# Patient Record
Sex: FEMALE | Race: WHITE | NOT HISPANIC OR LATINO | Employment: FULL TIME | URBAN - METROPOLITAN AREA
[De-identification: names, ages, dates, MRNs, and addresses within clinical notes are randomized per-mention and may not be internally consistent; named-entity substitution may affect disease eponyms.]

---

## 2023-02-16 ENCOUNTER — HOSPITAL ENCOUNTER (EMERGENCY)
Facility: CLINIC | Age: 43
Discharge: HOME | End: 2023-02-16
Attending: EMERGENCY MEDICINE | Admitting: EMERGENCY MEDICINE
Payer: COMMERCIAL

## 2023-02-16 VITALS
TEMPERATURE: 97.7 F | OXYGEN SATURATION: 100 % | SYSTOLIC BLOOD PRESSURE: 127 MMHG | HEART RATE: 65 BPM | HEIGHT: 70 IN | RESPIRATION RATE: 16 BRPM | WEIGHT: 141.3 LBS | BODY MASS INDEX: 20.23 KG/M2 | DIASTOLIC BLOOD PRESSURE: 64 MMHG

## 2023-02-16 DIAGNOSIS — F32.A DEPRESSION, UNSPECIFIED DEPRESSION TYPE: ICD-10-CM

## 2023-02-16 DIAGNOSIS — F41.0 ANXIETY ATTACK: ICD-10-CM

## 2023-02-16 LAB — SARS-COV-2 RNA RESP QL NAA+PROBE: NEGATIVE

## 2023-02-16 PROCEDURE — 250N000013 HC RX MED GY IP 250 OP 250 PS 637: Performed by: NURSE PRACTITIONER

## 2023-02-16 PROCEDURE — C9803 HOPD COVID-19 SPEC COLLECT: HCPCS

## 2023-02-16 PROCEDURE — 90791 PSYCH DIAGNOSTIC EVALUATION: CPT

## 2023-02-16 PROCEDURE — 99283 EMERGENCY DEPT VISIT LOW MDM: CPT | Performed by: NURSE PRACTITIONER

## 2023-02-16 PROCEDURE — 99285 EMERGENCY DEPT VISIT HI MDM: CPT | Mod: 25

## 2023-02-16 PROCEDURE — 250N000013 HC RX MED GY IP 250 OP 250 PS 637: Performed by: EMERGENCY MEDICINE

## 2023-02-16 PROCEDURE — U0003 INFECTIOUS AGENT DETECTION BY NUCLEIC ACID (DNA OR RNA); SEVERE ACUTE RESPIRATORY SYNDROME CORONAVIRUS 2 (SARS-COV-2) (CORONAVIRUS DISEASE [COVID-19]), AMPLIFIED PROBE TECHNIQUE, MAKING USE OF HIGH THROUGHPUT TECHNOLOGIES AS DESCRIBED BY CMS-2020-01-R: HCPCS | Performed by: EMERGENCY MEDICINE

## 2023-02-16 RX ORDER — SPIRONOLACTONE 50 MG/1
50 TABLET, FILM COATED ORAL
COMMUNITY

## 2023-02-16 RX ORDER — LORAZEPAM 1 MG/1
1 TABLET ORAL ONCE
Status: COMPLETED | OUTPATIENT
Start: 2023-02-16 | End: 2023-02-16

## 2023-02-16 RX ORDER — QUETIAPINE FUMARATE 50 MG/1
50 TABLET, FILM COATED ORAL AT BEDTIME
Qty: 30 TABLET | Refills: 0 | Status: SHIPPED | OUTPATIENT
Start: 2023-02-16

## 2023-02-16 RX ORDER — QUETIAPINE FUMARATE 50 MG/1
50 TABLET, FILM COATED ORAL ONCE
Status: COMPLETED | OUTPATIENT
Start: 2023-02-16 | End: 2023-02-16

## 2023-02-16 RX ORDER — SERTRALINE HYDROCHLORIDE 100 MG/1
150 TABLET, FILM COATED ORAL DAILY
Qty: 45 TABLET | Refills: 0 | Status: SHIPPED | OUTPATIENT
Start: 2023-02-16 | End: 2023-03-18

## 2023-02-16 RX ORDER — SERTRALINE HYDROCHLORIDE 100 MG/1
150 TABLET, FILM COATED ORAL AT BEDTIME
Refills: 0 | COMMUNITY
Start: 2023-02-16

## 2023-02-16 RX ORDER — TRAZODONE HYDROCHLORIDE 50 MG/1
50-150 TABLET, FILM COATED ORAL
COMMUNITY

## 2023-02-16 RX ORDER — ESTRADIOL 0.1 MG/D
1 FILM, EXTENDED RELEASE TRANSDERMAL
COMMUNITY

## 2023-02-16 RX ORDER — SERTRALINE HYDROCHLORIDE 100 MG/1
100 TABLET, FILM COATED ORAL AT BEDTIME
COMMUNITY
End: 2023-02-16

## 2023-02-16 RX ADMIN — QUETIAPINE FUMARATE 50 MG: 50 TABLET ORAL at 21:34

## 2023-02-16 RX ADMIN — SERTRALINE HYDROCHLORIDE 150 MG: 50 TABLET ORAL at 21:34

## 2023-02-16 RX ADMIN — LORAZEPAM 1 MG: 1 TABLET ORAL at 16:26

## 2023-02-16 ASSESSMENT — ACTIVITIES OF DAILY LIVING (ADL)
ADLS_ACUITY_SCORE: 33
ADLS_ACUITY_SCORE: 35

## 2023-02-16 ASSESSMENT — COLUMBIA-SUICIDE SEVERITY RATING SCALE - C-SSRS
2. HAVE YOU ACTUALLY HAD ANY THOUGHTS OF KILLING YOURSELF?: NO
ATTEMPT LIFETIME: NO
REASONS FOR IDEATION PAST MONTH: COMPLETELY TO END OR STOP THE PAIN (YOU COULDN'T GO ON LIVING WITH THE PAIN OR HOW YOU WERE FEELING)
1. HAVE YOU WISHED YOU WERE DEAD OR WISHED YOU COULD GO TO SLEEP AND NOT WAKE UP?: YES
REASONS FOR IDEATION LIFETIME: COMPLETELY TO END OR STOP THE PAIN (YOU COULDN'T GO ON LIVING WITH THE PAIN OR HOW YOU WERE FEELING)
1. IN THE PAST MONTH, HAVE YOU WISHED YOU WERE DEAD OR WISHED YOU COULD GO TO SLEEP AND NOT WAKE UP?: YES
TOTAL  NUMBER OF INTERRUPTED ATTEMPTS LIFETIME: NO
6. HAVE YOU EVER DONE ANYTHING, STARTED TO DO ANYTHING, OR PREPARED TO DO ANYTHING TO END YOUR LIFE?: YES
6. HAVE YOU EVER DONE ANYTHING, STARTED TO DO ANYTHING, OR PREPARED TO DO ANYTHING TO END YOUR LIFE?: YES
TOTAL  NUMBER OF ABORTED OR SELF INTERRUPTED ATTEMPTS LIFETIME: NO

## 2023-02-16 ASSESSMENT — ENCOUNTER SYMPTOMS: NERVOUS/ANXIOUS: 1

## 2023-02-16 NOTE — ED TRIAGE NOTES
Pt reports panic attack and paranoia for the last 24 hours. SI without plan. Pt tearful in triage. Contracts for safety in the lobby      Triage Assessment       Row Name 02/16/23 6042       Triage Assessment (Adult)    Airway WDL WDL       Respiratory WDL    Respiratory WDL WDL       Skin Circulation/Temperature WDL    Skin Circulation/Temperature WDL WDL       Cardiac WDL    Cardiac WDL WDL       Peripheral/Neurovascular WDL    Peripheral Neurovascular WDL WDL       Cognitive/Neuro/Behavioral WDL    Cognitive/Neuro/Behavioral WDL WDL

## 2023-02-16 NOTE — Clinical Note
Zully Llanes was seen and treated in our emergency department on 2/16/2023.  She may return to work on 02/22/2023.       If you have any questions or concerns, please don't hesitate to call.      Modesta Ashley, APRN CNP

## 2023-02-16 NOTE — ED PROVIDER NOTES
"  History     Chief Complaint:  Mental Health Problem     The history is provided by the patient.      Zully Llanes is a 42 year old female with a history of anxiety and depression who presents with 24 hours of increased anxiety and panic attacks. She reports that she is preparing to be laid off of her job which has caused her to experience an increased amount of stress. She notes having some suicidal ideation as well, but she notes that she has some emotional support and things motivating her to live. She states that she has a history of anxiety and panic attacks. She follows with psychiatry with [facility] and is currently taking an antidepressant but no medications for anxiety. She also takes omeprazole for GERD. She notes that she drinks alcohol and uses THC, but she has not used these substances today.    Independent Historian:   None - Patient Only    Review of External Notes: I reviewed a note from 12/11/2013 in Care Everywhere.     ROS:  Review of Systems   Psychiatric/Behavioral: Positive for suicidal ideas. The patient is nervous/anxious (per patient).    All other systems reviewed and are negative.    Allergies:   The patient has no known allergies.    Medications:    Omeprazole   Lexapro  Propecia    Past Medical History:    GERD  Depression   Anxiety      Social History:  The patient presents to the ED alone.  The patient is .  The patient uses alcohol and THC.  The patient follows with psychiatry through [facility].    Physical Exam     Patient Vitals for the past 24 hrs:   BP Temp Temp src Pulse Resp SpO2 Height Weight   02/16/23 1623 (!) 224/189 97.2  F (36.2  C) Temporal 79 18 100 % 1.753 m (5' 9\") 63.5 kg (140 lb)      Physical Exam  General: Patient is alert and tearful with depressed mood and flat affect.  Mildly anxious appearing as well  HEENT: Head atraumatic    Eyes: pupils equal and reactive. Conjunctiva clear   Nares: patent   Oropharynx: no lesions, uvula midline, no " palatal draping, normal voice, no trismus  Neck: Supple without lymphadenopathy, no meningismus  Chest: Heart regular rate and rhythm.   Lungs: Equal clear to auscultation with no wheeze or rales  Abdomen: Soft, non tender, nondistended, normal bowel sounds  Back: No costovertebral angle tenderness, no midline C, T or L spine tenderness  Neuro: Grossly nonfocal, normal speech, strength equal bilaterally, CN 2-12 intact  Extremities: No deformities, equal radial and DP pulses. No clubbing, cyanosis.  No edema  Skin: Warm and dry with no rash.       Emergency Department Course     Laboratory:  Labs Ordered and Resulted from Time of ED Arrival to Time of ED Departure   COVID-19 VIRUS (CORONAVIRUS) BY PCR - Normal       Result Value    SARS CoV2 PCR Negative        Emergency Department Course & Assessments:  PSS-3    Date and Time Over the past 2 weeks have you felt down, depressed, or hopeless? Over the past 2 weeks have you had thoughts of killing yourself? Have you ever attempted to kill yourself? When did this last happen? User   02/16/23 1621 yes yes no -- SH      C-SSRS ([place])    Date and Time Q1 Wished to be Dead (Past Month) Q2 Suicidal Thoughts (Past Month) Q3 Suicidal Thought Method Q4 Suicidal Intent without Specific Plan Q5 Suicide Intent with Specific Plan Q6 Suicide Behavior (Lifetime) Within the Past 3 Months? RETIRED: Level of Risk per Screen Screening Not Complete User   02/16/23 1621 yes yes no no no no -- -- --         Interventions:  Medications   LORazepam (ATIVAN) tablet 1 mg (1 mg Oral Given 2/16/23 1626)      Social Determinants of Health affecting care:  None and Employment/Unemployment and transgender    Assessments:  1615 I obtained history and performed an examination of the patient. We discussed plan of care.    Disposition:  The patient was transferred to [facility].     Impression & Plan      Medical Decision Making:  Zully Llanes is a 42 year old male to female transgender patient  who presents to the emergency department with increased anxiety, panic attack and depression.  Patient has a history of anxiety and panic attacks.  She is currently medicated for depression.  She states she has an impending layoff at work and in feeling increased stress.  She has some suicidal thoughts without a plan.  She contracts for safety while here in the emergency department.  Patient not currently on any rescue meds for anxiety.  She uses some THC and alcohol but not today.  She denies any street drugs.  Patient not currently seeing a therapist.  She does follow with a psychiatrist.  Patient came here in search of being seen in the empath unit.  She is medically screened by me.  Plan is for transfer to [facility] for mental health assessment and final disposition planning.  Patient is agreeable with this plan and all questions and concerns addressed.    Diagnosis:    ICD-10-CM    1. Anxiety attack  F41.0       2. Depression, unspecified depression type  F32.A           Scribe Disclosure:  I, Tigist Bustos, am serving as a scribe at 5:04 PM on 2/16/2023 to document services personally performed by Bethany Monge MD based on my observations and the provider's statements to me.     2/16/2023   MD Saleem Donnelly Maria Bea, MD  02/16/23 9848

## 2023-02-17 NOTE — DISCHARGE INSTRUCTIONS
You were scheduled a therapy appointment for:  [facility]  [address] [facility]  [phone]  2/22/2023 1:00 pm    Please follow up with your psychiatrist as needed    It is your responsibility to contact your insurance company directly to verify coverage, eligibility, payment, and benefit information for any appointments or referrals listed above. [facility] maintains an extensive network of licensed behavioral health providers to connect patients with the services they need. We do not charge providers a fee to participate in our referral network. We match patients with providers based on a patient's specific treatment needs, insurance coverage, and location. Our first effort will be to refer you to a provider within your care system, and will utilize providers outside your care system as needed      Here is information abou  Who is [facility]?  [facility] was founded in January of 2020 by co-owners STEPHANY Sorto (they/them) and Nayla Abdullahi MA, LP (she/her). [facility] provides individual, relational, family and group psychotherapy services to ALL people, with a particular focus on working with transgender and queer-identified members of our community. We have had the amazing opportunity to grow our practice and vision to include a diverse and very experienced group of providers. Together as a group we bring decades of combined personal and professional experience to our work and we all share a deep gratitude for the ability to walk with folks as they navigate life s challenges. We are an organization that is dedicated to the de-pathologizing of emotional suffering that is often caused by various forms of systemic oppression. We believe that the mental health industry has been centered in a dominant white, heteronormative, patriarchal, gender-binary narrative. We actively work to dismantle mental health care practices that have  been formed from and upheld by systemic privilege and to find healing, meaning, and connection through culturally affirming practices.    CONTACT & LOCATION  [facility]  [facility] [place] [address] [facility]  [address]  [place] [address]      O: [phone]  F: [phone]    [facility] is currently offering the following groups and workshops.  Trans Feminine Support Group  EVERY OTHER MONDAY AT 5:15-6:45PM  $25 PER GROUP SESSION (SLIDING FEE OPTIONS AVAILABLE)  Creating a safe and supportive atmosphere to foster collective healing. Contact Jorge with questions or to schedule an intake at  [email or web address]    Aftercare Plan    Follow up with established providers and supports as scheduled. Continue taking medications as prescribed. Abstain from drugs and alcohol. Utilize your [facility] mental [facility] crisis team as needed. They are available 24/7. Contact information is listed below.     If I am feeling unsafe or I am in a crisis, I will:   Contact my established care providers   Call the National Suicide Prevention Lifeline: 580.269.5373   Go to the nearest emergency room   Call 319     Warning signs that I or other people might notice when a crisis is developing for me: changes to sleep, appetite or mood, increased anger, agitation or irritability, feeling depressed or hopeless, spending more time alone or talking less, increased crying, decreased productivity, seeing or hearing things that aren't there, thoughts of not wanting to live anymore or of actually killing myself, thoughts of hurting others    Things I am able to do on my own to cope or help me feel better: watching a favorite tv show or movie, listening to music I enjoy, going outside and breathing fresh air, going for a walk or exercising, taking a shower or bath, a cold or hot beverage, a healthy snack, drawing/coloring/painting, journaling, singing  or dancing, deep breathing     I can try practicing square breathing when I begin to feel anxious - inhale through the nose for the count of 4 and the first line on the square. Exhale through the mouth for the count of 4 for the second line of the square. Repeat to complete the square. Repeat the square as many times as needed.    I can also use my five senses to practice mindfulness and grounding. What are five things I can see, four things I can hear, three things I can feel, two things I can smell, and one thing I can taste.     Things that I am able to do with others to cope or help me feel better: sometimes just talking or spending time with someone else, sharing a meal or having coffee, watching a movie or playing a game, going for a walk or exercising    I can also use community resources including mental health hotlines, [facility] crisis teams, or apps.     Things I can use or do for distraction: movies/tv, music, reading, games, drawing/coloring/painting or other art, essential oils, exercise, cleaning/organizing, puzzles, crossword puzzles, word search, Sudoku       I can also download a meditation or relaxation jake, like Calm, Headspace, or Insight Timer (all three offer a free version)    A great website resource is Change to Chill with active coping skills    Changes I can make to support my mental health and wellness: Attend scheduled mental health therapy and psychiatric appointments. Take my medications as prescribed. Maintain a daily schedule/routine. Abstain from all mood altering substances, including drugs, alcohol, or medications not currently prescribed to me. Implement a self-care routine.      People in my life that I can ask for help: friends or family, trusted teachers/staff/colleagues, trusted members of my community or place of Orthodoxy, mental health crisis lines, or 911    Your [facility] has a mental health crisis team you can call 24/7: [facility], [phone]    Other things that  "are important when I m in crisis: to remember that the feelings I am having right now are temporary, and it won't feel like this forever, and that it is okay and important to ask for help    Crisis Lines  Crisis Text Line  Text 189941  You will be connected with a trained live crisis counselor to provide support.    Por jazmynanol, texto  DONNA a 965324 o texto a 442-AYUDAME en WhatsApp    National Hope Line  1.800.SUICIDE [3169157]      Community Resources  Fast Tracker  Linking people to mental health and substance use disorder resources  NeterockRotaPostn.Symwave     [place] Mental Health Warm Line  Peer to peer support  Monday thru Saturday, 12 pm to 10 pm  243.698.1178 or 7.176.780.3150  Text \"Support\" to 47331    National Worth on Mental Illness (EMILE)  353.764.9115 or 1.888.EMILE.HELPS      Mental Health Apps  My3  https://Ignite100.org/    VirtualHopeBox  https://[employer]/apps/virtual-hope-box/      Additional Information  Today you were seen by a licensed mental health professional through Triage and Transition services, Behavioral Healthcare Providers ([facility])  for a crisis assessment in the Emergency Department at [facility].  It is recommended that you follow up with your established providers (psychiatrist, mental health therapist, and/or primary care doctor - as relevant) as soon as possible. Coordinators from [facility] will be calling you in the next 24-48 hours to ensure that you have the resources you need.  You can also contact [facility] coordinators directly at [phone]. You may have been scheduled for or offered an appointment with a mental health provider. [facility] maintains an extensive network of licensed behavioral health providers to connect patients with the services they need.  We do not charge providers a fee to participate in our referral network.  We match patients with providers based on a patient's specific needs, insurance coverage, and location.  Our first effort will be to refer " you to a provider within your care system, and will utilize providers outside your care system as needed.

## 2023-02-17 NOTE — ED NOTES
Zully is a 42 year old transgender male to female with history of depression, anxiety and weekly pot use. Patient claims she consumes a few drinks nightly & she has not used Antabuse since June 2022. Patient received from ED due to increasing panic attacks after trying to reduce Zoloft under the care of her doctor. Patient reports layoffs will be happening at work this month as well. Patient is not sure if she will loose her job. Patient endorses fleeting suicidal ideation but no plan. Patient claims no history of suicide attempts. No homicidal ideation endorsed.      Nursing and risk assessments completed. Assessments reviewed with [facility] and physician. Admission information reviewed with patient. Patient given a tour of [facility] and instructions on using the facility. Questions regarding [facility] addressed. Pt safety search completed.

## 2023-02-17 NOTE — CONSULTS
Diagnostic Evaluation Consultation  Crisis Assessment    Patient was assessed: In Person  Patient location: [facility]  Was a release of information signed: Yes. Providers included on the release: [facility]      Referral Data and Chief Complaint  Zully is a 42 year old, who uses she/her pronouns, and presents to the ED alone. Patient is referred to the ED by self. Patient is presenting to the ED for the following concerns: anxiety, paranoia.      Informed Consent and Assessment Methods     Patient is her own guardian. Writer met with patient and explained the crisis assessment process, including applicable information disclosures and limits to confidentiality, assessed understanding of the process, and obtained consent to proceed with the assessment. Patient was observed to be able to participate in the assessment as evidenced by verbal understanding of the assessment process. Assessment methods included conducting a formal interview with patient, review of medical records, collaboration with medical staff, and obtaining relevant collateral information from family and community providers when available..     Over the course of this crisis assessment provided reassurance, offered validation, engaged patient in problem solving and disposition planning, worked with patient on safety and aftercare planning and provided psychoeducation. Patient's response to interventions was engaged     Summary of Patient Situation  Pt brought themselves in the ED today to address concerns of increased anxiety, panic, and paranoia related to work stressors and interpersonal stressors.    Brief Psychosocial History  Pt currently lives at home with her  of several years and pets.  Pt's 2 children live with her exwife, but she reports a highly functional dynamic between the 2 blended households.  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  Pt holds a PHD.  Pt reports recently [employer] announced layoffs  without specifics of who/dept or timeframe's.  Recently she has seen moving trucks and an increase in surveillance in work that is increase her MH sx.    Significant Clinical History  Pt reports a hx of anxiety and depression.  Pt endorses increase sx of anxiety and panic surrounding work.  Pt reports an increase in sadness, hopelessness, and worthlessness.  Pt denies any current sx of psychosis or sarah.  Pt reports sx of paranoia/delusional thinking related to work and feeling like she is being watched more thoroughly, fears that her  is lying to her about his unemployment and current transition in career.  Pt does not present as psychotic.  Pt presents as alert, oriented, calm and cooperative.     Pt reports chronic baseline suicidal ideation without specific planning or intent.  Pt reports that last night she acted on some preparation and looked at her life insurance to see if it included suicide.  Pt reports that her thoughts are tied to being the breadwinner and ensuring that her family is financially secure.  Pt denies any previous suicide attempts.  Pt denies any self harm or homicidal ideation.  Pt presents as future and goal oriented.    Pt denies any previous hospitalizations.  Pt has current psychiatry services via [facility] where she is prescribed Zoloft and Trazodone.  Pt reports that she has been recently doing so well her Zoloft was decreased; however in discussion with her provider it was re established back at 100mg for the last 2 weeks.  Pt denies any current therapist.  Pt desires such.  Pt denies any previous MH IOP or PHP.      Pt denies any on going medical concerns.  Pt's trans health care is [facility] at [facility]  Pt reports a previous hx with substance concerns.  Pt is currently using THC edibles 2 times a week and drinking 2 drinks daily.  Pt reports a previous hx with binge drinking.  Pt reports that her wkd use escalates to 4 drinks.  Pt denies any previous detox.  Pt  did complete a previous IOP CD tx and has been historically prescribed Antabuse.     Collateral Information  The following information was received from [employer] whose relationship to the patient is . Information was obtained via phone. Their phone number is  and they last had contact with patient on today.    What happened today: She reports that she wanted to come in and address her increased paranoia relating to  and employment stress.    What is different about patient's functioning: He states that he quit his job of 15 years to transition into a different career which has been a difficult transitions.  He states that she is really worried about the financial security of the family and the news of layoffs has been highly overwhelming.  He states that he additionally had some stress of having some narcotics around due to his back and there was paranoia surrounding having addictive medications in the house and what the purpose of those were (he has since thrown them out)      Concern about alcohol/drug use: Yes ETOH.  Does have Antabuse.    What do you think the patient needs: medication adjustment    Has patient made comments about wanting to kill themselves/others:  No He states that she made some unusual statement last night that If I'm not here much longer I love you, but he denies that he believes this was meant as suicidal. He is not aware of any previous suicide attempts.    If d/c is recommended, can they take part in safety/aftercare planning: Yes .    Other information:      Risk Assessment  [place] Suicide Severity Rating Scale Full Clinical Version:2/16/2023  Suicidal Ideation  1. Wish to be Dead (Lifetime): Yes  1. Wish to be Dead (Past 1 Month): Yes  2. Non-Specific Active Suicidal Thoughts (Lifetime): No  Intensity of Ideation  Most Severe Ideation Rating (Lifetime): 3  Most Severe Ideation Rating (Past 1 Month): 3  Frequency (Lifetime): Daily or almost daily  Frequency (Past  1 Month): Daily or almost daily  Duration (Lifetime): More than 8 hours/persistent or continuous  Duration (Past 1 Month): More than 8 hours/persistent or continuous  Controllability (Lifetime): Can control thoughts with little difficulty  Controllability (Past 1 Month): Can control thoughts with little difficulty  Deterrents (Lifetime): Deterrents probably stopped you  Deterrents (Past 1 Month): Deterrents probably stopped you  Reasons for Ideation (Lifetime): Completely to end or stop the pain (You couldn't go on living with the pain or how you were feeling)  Reasons for Ideation (Past 1 Month): Completely to end or stop the pain (You couldn't go on living with the pain or how you were feeling)  Suicidal Behavior  Actual Attempt (Lifetime): No  Has subject engaged in non-suicidal self-injurious behavior? (Lifetime): No  Interrupted Attempts (Lifetime): No  Aborted or Self-Interrupted Attempt (Lifetime): No  Preparatory Acts or Behavior (Lifetime): Yes  Preparatory Acts or Behavior (Past 3 Months): Yes  C-SSRS Risk (Lifetime/Recent)  Calculated C-SSRS Risk Score (Lifetime/Recent): High Risk    Validity of evaluation is not impacted by presenting factors during interview .   Comments regarding subjective versus objective responses to [place] tool: n/a  Environmental or Psychosocial Events: helplessness/hopelessness, other life stressors and ongoing abuse of substances  Chronic Risk Factors: chronic and ongoing sleep difficulties and LGBTQ+ orientation    Warning Signs: talking or writing about death, dying, or suicide, hopelessness, increasing substance use or abuse, anxiety, agitation, unable to sleep, sleeping all the time and no reason for living, no sense of purpose in life  Protective Factors: strong bond to family unit, community support, or employment, intact marriage or domestic partnership, responsibilities and duties to others, including pets and children, lives in a responsibly safe and stable  environment, good treatment engagement, sense of importance of health and wellness, supportive ongoing medical and mental health care relationships, help seeking, good problem-solving, coping, and conflict resolution skills, sense of belonging and optimistic outlook - identification of future goals  Interpretation of Risk Scoring, Risk Mitigation Interventions and Safety Plan:  PT is currently scored as high risk.  This does not appear to be an accurate current reflection.  Pt endorses chronic baseline passive suicidal ideation without planning or intent.  Pt acknowledges her one preparation act last night of looking at her will.  Pt notes that this is tied to ensuring that the family is financially secure and the panic surrounding a possible lay off.  Pt denies any active planning or intent.  Pt presents as highly future and goal oriented.  Pt presents as help seeking.         Does the patient have thoughts of harming others? No     Is the patient engaging in sexually inappropriate behavior?  no        Current Substance Abuse     Is there recent substance abuse? ETOH and THC     Was a urine drug screen or blood alcohol level obtained: No       Mental Status Exam     Affect: Appropriate   Appearance: Appropriate    Attention Span/Concentration: Attentive  Eye Contact: Engaged   Fund of Knowledge: Appropriate    Language /Speech Content: Fluent   Language /Speech Volume: Soft and Normal    Language /Speech Rate/Productions: Articulate    Recent Memory: Intact   Remote Memory: Intact   Mood: Anxious    Orientation to Person: Yes    Orientation to Place: Yes   Orientation to Time of Day: Yes    Orientation to Date: Yes    Situation (Do they understand why they are here?): Yes    Psychomotor Behavior: Normal    Thought Content: Delusions   Thought Form: Intact      History of commitment: No       Medication    Psychotropic medications: Yes. Pt is currently taking Zoloft Trazodone. Medication compliant: Yes. Recent  medication changes: Yes re increase of Zoloft after previous decrease  Medication changes made in the last two weeks: No       Current Care Team    Primary Care Provider: [facility]  Psychiatrist: [facility]  Therapist: No  : No     CTSS or ARMHS: No  ACT Team: No  Other: No      Diagnosis    311 (F32.9) Unspecified Depressive Disorder   300.00 (F41.9) Unspecified Anxiety Disorder     Clinical Summary and Substantiation of Recommendations    Although Pt reports current passive suicidal ideation, she denies any active intent or planning.  PT denies any self harm. Pt denies any current homicidal ideation, intent or planning.  PT is able to engage in safety planning. Pt appears future and goal oriented.  PT is able to engage in a discussion about their protective factors.  PT does not currently appear to be in need of acute stabilization for overt psychosis, sarah, delusional thinking or paranoia.  PT is appropriate to transition into outpatient community services at this time.   Pt was offered observation status of which she declined.    At the time of discharge, the patient's acute suicide risk was determined to be low due to the following factors: Reduction in the intensity of mood/anxiety symptoms that preceded the admission, denial of suicidal thoughts, denies feeling helpless or helpless, not currently under the influence of alcohol or illicit substances, denies experiencing command hallucinations, no immediate access to firearms. The patient's acute risk could be higher if noncompliant with their treatment plan, medications, follow-up appointments or using illicit substances or alcohol. Protective factors include: social supports, stable housing  Disposition    Recommended disposition: Individual Therapy and Medication Management       Reviewed case and recommendations with attending provider. Attending Name: Modesta Ashley NP       Attending concurs with disposition: Yes       Patient concurs with  disposition: Yes       Guardian concurs with disposition: NA      Final disposition: Individual therapy  and Medication management.     Outpatient Details (if applicable):   Aftercare plan and appointments placed in the AVS and provided to patient: Yes. Given to patient by RN    Was lethal means counseling provided as a part of aftercare planning? Yes - describe Writer spoke with  about removing extra meds to ensure procaution;       Assessment Details    Patient interview started at: 1900 and completed at: 2000.     Total duration spent on the patient case in minutes: 1.0 hrs      CPT code(s) utilized: 57864 - Psychotherapy for Crisis - 60 (30-74*) min       Yesenia Riley [facility], Psychotherapist  [facility] - Triage & Transition Services  Callback: [phone]    You were scheduled a therapy appointment for:  Venu Baez [facility] [employer] [place] [address] [address]  [phone]  2/22/2023 1:00 pm    Please follow up with your psychiatrist as needed    It is your responsibility to contact your insurance company directly to verify coverage, eligibility, payment, and benefit information for any appointments or referrals listed above. [facility] maintains an extensive network of licensed behavioral health providers to connect patients with the services they need. We do not charge providers a fee to participate in our referral network. We match patients with providers based on a patient's specific treatment needs, insurance coverage, and location. Our first effort will be to refer you to a provider within your care system, and will utilize providers outside your care system as needed      Here is information abou  Who is [facility]?  [facility] was founded in January of 2020 by co-owners STEPHANY Sorto (they/them) and Nayla Abdullahi MA, LP (she/her). [facility] provides individual, relational, family and group psychotherapy services to ALL people, with a  particular focus on working with transgender and queer-identified members of our community. We have had the amazing opportunity to grow our practice and vision to include a diverse and very experienced group of providers. Together as a group we bring decades of combined personal and professional experience to our work and we all share a deep gratitude for the ability to walk with folks as they navigate life s challenges. We are an organization that is dedicated to the de-pathologizing of emotional suffering that is often caused by various forms of systemic oppression. We believe that the mental health industry has been centered in a dominant white, heteronormative, patriarchal, gender-binary narrative. We actively work to dismantle mental health care practices that have been formed from and upheld by systemic privilege and to find healing, meaning, and connection through culturally affirming practices.    CONTACT & LOCATION  [facility] [address] [place] [address] [facility] [address] [place] [address]      O: [phone]  F: [phone]    [facility] is currently offering the following groups and workshops.  Trans Feminine Support Group  EVERY OTHER MONDAY AT 5:15-6:45PM  $25 PER GROUP SESSION (SLIDING FEE OPTIONS AVAILABLE)  Creating a safe and supportive atmosphere to foster collective healing. Contact Jorge with questions or to schedule an intake at  [email or web address] ([email or web address])    Aftercare Plan    Follow up with established providers and supports as scheduled. Continue taking medications as prescribed. Abstain from drugs and alcohol. Utilize your [facility] crisis team as needed. They are available 24/7. Contact information is listed below.     If I am feeling unsafe or I am in a crisis, I will:   Contact my established care providers   Call the National Suicide Prevention Lifeline: 568.504.7933   Go to  the nearest emergency room   Call 911     Warning signs that I or other people might notice when a crisis is developing for me: changes to sleep, appetite or mood, increased anger, agitation or irritability, feeling depressed or hopeless, spending more time alone or talking less, increased crying, decreased productivity, seeing or hearing things that aren't there, thoughts of not wanting to live anymore or of actually killing myself, thoughts of hurting others    Things I am able to do on my own to cope or help me feel better: watching a favorite tv show or movie, listening to music I enjoy, going outside and breathing fresh air, going for a walk or exercising, taking a shower or bath, a cold or hot beverage, a healthy snack, drawing/coloring/painting, journaling, singing or dancing, deep breathing     I can try practicing square breathing when I begin to feel anxious - inhale through the nose for the count of 4 and the first line on the square. Exhale through the mouth for the count of 4 for the second line of the square. Repeat to complete the square. Repeat the square as many times as needed.    I can also use my five senses to practice mindfulness and grounding. What are five things I can see, four things I can hear, three things I can feel, two things I can smell, and one thing I can taste.     Things that I am able to do with others to cope or help me feel better: sometimes just talking or spending time with someone else, sharing a meal or having coffee, watching a movie or playing a game, going for a walk or exercising    I can also use community resources including mental health hotlines, [facility] crisis teams, or apps.     Things I can use or do for distraction: movies/tv, music, reading, games, drawing/coloring/painting or other art, essential oils, exercise, cleaning/organizing, puzzles, crossword puzzles, word search, Sudoku       I can also download a meditation or relaxation jake, like Calm, Headspace, or  "Insight Timer (all three offer a free version)    A great website resource is Change to Chill with active coping skills    Changes I can make to support my mental health and wellness: Attend scheduled mental health therapy and psychiatric appointments. Take my medications as prescribed. Maintain a daily schedule/routine. Abstain from all mood altering substances, including drugs, alcohol, or medications not currently prescribed to me. Implement a self-care routine.      People in my life that I can ask for help: friends or family, trusted teachers/staff/colleagues, trusted members of my community or place of Taoist, mental health crisis lines, or 911    Your [facility] has a mental health crisis team you can call 24/7: [facility] Adult, [phone]    Other things that are important when I m in crisis: to remember that the feelings I am having right now are temporary, and it won't feel like this forever, and that it is okay and important to ask for help    Crisis Lines  Crisis Text Line  Text 456099  You will be connected with a trained live crisis counselor to provide support.    Por espanol, texto  DONNA a 855210 o texto a 442-AYUDAME en WhatsApp    National Hope Line  1.800.SUICIDE [7095539]      Community Resources  Fast Tracker  Linking people to mental health and substance use disorder resources  Lat49.org     [place] Mental Health Warm Line  Peer to peer support  Monday thru Saturday, 12 pm to 10 pm  024.955.2194 or 9.783.524.6182  Text \"Support\" to 38393    National [place] on Mental Illness (EMILE)  008.699.0504 or 1.888.EMILE.HELPS      Mental Health Apps  My3  https://my3app.org/    VirtualHopeBox  https://frooly.org/apps/virtual-hope-box/      Additional Information  Today you were seen by a licensed mental health professional through Triage and Transition services, [facility] ([facility])  for a crisis assessment in the Emergency Department at [facility] [place].  It is recommended that you follow up with your established providers (psychiatrist, mental health therapist, and/or primary care doctor - as relevant) as soon as possible. Coordinators from [facility] will be calling you in the next 24-48 hours to ensure that you have the resources you need.  You can also contact [facility] coordinators directly at [phone]. You may have been scheduled for or offered an appointment with a mental health provider. [facility] maintains an extensive network of licensed behavioral health providers to connect patients with the services they need.  We do not charge providers a fee to participate in our referral network.  We match patients with providers based on a patient's specific needs, insurance coverage, and location.  Our first effort will be to refer you to a provider within your care system, and will utilize providers outside your care system as needed.

## 2023-02-17 NOTE — ED NOTES
Patient escorted off the unit at 2200 accompanied by [facility] staff. Discharged to home via ride with . Mood appeared stable with a hopeful outlook. Patient agreed to discharge plan. Discharge instructions reviewed with patient including follow-up care plan. Two medications sent to home pharmacy, Seroquel and an increased dose of Zoloft. Reviewed safety plan and outpatient resources. Denied SI and HI. All belongings that were brought into the hospital have been returned to patient including phone, coat and purse. Patient wearing her contact lenses upon discharge.